# Patient Record
Sex: FEMALE | Race: BLACK OR AFRICAN AMERICAN | URBAN - METROPOLITAN AREA
[De-identification: names, ages, dates, MRNs, and addresses within clinical notes are randomized per-mention and may not be internally consistent; named-entity substitution may affect disease eponyms.]

---

## 2023-10-05 ENCOUNTER — HOSPITAL ENCOUNTER (OUTPATIENT)
Dept: MAMMOGRAPHY | Age: 38
Discharge: HOME | End: 2023-10-10

## 2023-10-05 DIAGNOSIS — Z12.31 VISIT FOR SCREENING MAMMOGRAM: ICD-10-CM

## 2024-03-08 ENCOUNTER — HOSPITAL ENCOUNTER (EMERGENCY)
Age: 39
Discharge: HOME | End: 2024-03-08
Attending: EMERGENCY MEDICINE
Payer: COMMERCIAL

## 2024-03-08 ENCOUNTER — APPOINTMENT (OUTPATIENT)
Dept: GENERAL RADIOLOGY | Age: 39
End: 2024-03-08
Payer: COMMERCIAL

## 2024-03-08 VITALS
WEIGHT: 188.49 LBS | SYSTOLIC BLOOD PRESSURE: 96 MMHG | DIASTOLIC BLOOD PRESSURE: 63 MMHG | TEMPERATURE: 98.1 F | HEART RATE: 64 BPM | RESPIRATION RATE: 18 BRPM | OXYGEN SATURATION: 100 %

## 2024-03-08 DIAGNOSIS — S39.012A STRAIN OF LUMBAR REGION, INITIAL ENCOUNTER: ICD-10-CM
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
## 2024-03-08 DIAGNOSIS — T07.XXXA MULTIPLE CONTUSIONS: ICD-10-CM

## 2024-03-08 PROCEDURE — 73522 X-RAY EXAM HIPS BI 3-4 VIEWS: CPT

## 2024-03-08 PROCEDURE — 73562 X-RAY EXAM OF KNEE 3: CPT

## 2024-03-08 PROCEDURE — 73630 X-RAY EXAM OF FOOT: CPT

## 2024-03-08 PROCEDURE — 99283 EMERGENCY DEPT VISIT LOW MDM: CPT | Performed by: EMERGENCY MEDICINE

## 2024-03-08 PROCEDURE — 72100 X-RAY EXAM L-S SPINE 2/3 VWS: CPT

## 2024-03-08 RX ORDER — IBUPROFEN 800 MG/1
800 TABLET ORAL EVERY 8 HOURS PRN
Qty: 30 TABLET | Refills: 0 | Status: SHIPPED | OUTPATIENT
Start: 2024-03-08

## 2024-03-08 RX ORDER — CYCLOBENZAPRINE HCL 10 MG
10 TABLET ORAL 3 TIMES DAILY PRN
Qty: 30 TABLET | Refills: 0 | Status: SHIPPED | OUTPATIENT
Start: 2024-03-08 | End: 2024-03-18
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
## 2024-05-07 ENCOUNTER — HOSPITAL ENCOUNTER (EMERGENCY)
Age: 39
Discharge: HOME | End: 2024-05-07
Payer: COMMERCIAL

## 2024-05-07 ENCOUNTER — APPOINTMENT (OUTPATIENT)
Dept: CT IMAGING | Age: 39
End: 2024-05-07
Payer: COMMERCIAL

## 2024-05-07 VITALS
DIASTOLIC BLOOD PRESSURE: 60 MMHG | HEART RATE: 55 BPM | RESPIRATION RATE: 16 BRPM | HEIGHT: 67 IN | OXYGEN SATURATION: 95 % | WEIGHT: 189.6 LBS | SYSTOLIC BLOOD PRESSURE: 109 MMHG | BODY MASS INDEX: 29.76 KG/M2 | TEMPERATURE: 98 F

## 2024-05-07 DIAGNOSIS — R51.9 RIGHT SIDED FACIAL PAIN: ICD-10-CM

## 2024-05-07 DIAGNOSIS — K11.8 PAROTID MASS: Primary | ICD-10-CM

## 2024-05-07 LAB
ANION GAP SERPL CALCULATED.3IONS-SCNC: 12 MMOL/L (ref 3–16)
BASOPHILS # BLD: 0.1 K/UL (ref 0–0.2)
BASOPHILS NFR BLD: 1.1 %
BUN SERPL-MCNC: 10 MG/DL (ref 7–20)
CALCIUM SERPL-MCNC: 8.9 MG/DL (ref 8.3–10.6)
CHLORIDE SERPL-SCNC: 103 MMOL/L (ref 99–110)
CO2 SERPL-SCNC: 22 MMOL/L (ref 21–32)
CREAT SERPL-MCNC: 0.6 MG/DL (ref 0.6–1.1)
DEPRECATED RDW RBC AUTO: 14.7 % (ref 12.4–15.4)
EOSINOPHIL # BLD: 0.2 K/UL (ref 0–0.6)
EOSINOPHIL NFR BLD: 3 %
GFR SERPLBLD CREATININE-BSD FMLA CKD-EPI: >90 ML/MIN/{1.73_M2}
GLUCOSE SERPL-MCNC: 113 MG/DL (ref 70–99)
HCT VFR BLD AUTO: 37 % (ref 36–48)
HGB BLD-MCNC: 12.6 G/DL (ref 12–16)
LYMPHOCYTES # BLD: 4 K/UL (ref 1–5.1)
LYMPHOCYTES NFR BLD: 48.1 %
MCH RBC QN AUTO: 28.8 PG (ref 26–34)
MCHC RBC AUTO-ENTMCNC: 34 G/DL (ref 31–36)
MCV RBC AUTO: 84.6 FL (ref 80–100)
MONOCYTES # BLD: 0.4 K/UL (ref 0–1.3)
MONOCYTES NFR BLD: 4.9 %
NEUTROPHILS # BLD: 3.6 K/UL (ref 1.7–7.7)
NEUTROPHILS NFR BLD: 42.9 %
PLATELET # BLD AUTO: 263 K/UL (ref 135–450)
PLATELET BLD QL SMEAR: ADEQUATE
PMV BLD AUTO: 9.4 FL (ref 5–10.5)
POTASSIUM SERPL-SCNC: 3.9 MMOL/L (ref 3.5–5.1)
RBC # BLD AUTO: 4.37 M/UL (ref 4–5.2)
REASON FOR REJECTION: NORMAL
REJECTED TEST: NORMAL
SLIDE REVIEW: NORMAL
SODIUM SERPL-SCNC: 137 MMOL/L (ref 136–145)
TSH SERPL DL<=0.005 MIU/L-ACNC: 0.99 UIU/ML (ref 0.27–4.2)
WBC # BLD AUTO: 8.3 K/UL (ref 4–11)

## 2024-05-07 PROCEDURE — 80048 BASIC METABOLIC PNL TOTAL CA: CPT

## 2024-05-07 PROCEDURE — 85025 COMPLETE CBC W/AUTO DIFF WBC: CPT

## 2024-05-07 PROCEDURE — 84443 ASSAY THYROID STIM HORMONE: CPT

## 2024-05-07 PROCEDURE — 99285 EMERGENCY DEPT VISIT HI MDM: CPT

## 2024-05-07 PROCEDURE — 6360000004 HC RX CONTRAST MEDICATION: Performed by: PHYSICIAN ASSISTANT

## 2024-05-07 PROCEDURE — 70491 CT SOFT TISSUE NECK W/DYE: CPT

## 2024-05-07 RX ADMIN — IOPAMIDOL 75 ML: 755 INJECTION, SOLUTION INTRAVENOUS at 21:30

## 2024-05-07 ASSESSMENT — PAIN DESCRIPTION - ORIENTATION: ORIENTATION: RIGHT

## 2024-05-07 ASSESSMENT — PAIN DESCRIPTION - DESCRIPTORS: DESCRIPTORS: DISCOMFORT

## 2024-05-07 ASSESSMENT — PAIN DESCRIPTION - LOCATION: LOCATION: FACE

## 2024-05-07 ASSESSMENT — PAIN DESCRIPTION - ONSET: ONSET: PROGRESSIVE

## 2024-05-07 ASSESSMENT — PAIN DESCRIPTION - FREQUENCY: FREQUENCY: CONTINUOUS

## 2024-05-07 ASSESSMENT — PAIN SCALES - GENERAL: PAINLEVEL_OUTOF10: 6

## 2024-05-07 ASSESSMENT — PAIN DESCRIPTION - PAIN TYPE: TYPE: ACUTE PAIN

## 2024-05-07 ASSESSMENT — PAIN - FUNCTIONAL ASSESSMENT: PAIN_FUNCTIONAL_ASSESSMENT: ACTIVITIES ARE NOT PREVENTED

## 2024-05-07 ASSESSMENT — PAIN DESCRIPTION - DIRECTION: RADIATING_TOWARDS: THROAT

## 2024-05-07 NOTE — ED TRIAGE NOTES
Patient to the ER with complaints of swelling to the right side of her jaw that is causing pain radiating into her throat.   Denies any injury to the area.  Denies any concern for dental problems.   Symptoms started today.     Patient is requesting to have her thyroid checked.  She reports fatigue for several months and says thyroid problems run in her family.     Alert and oriented x4 and ambulatory at time of triage.

## 2024-05-08 NOTE — ED PROVIDER NOTES
worsen.        I am the Primary Clinician of Record.    FINAL IMPRESSION      1. Parotid mass    2. Right sided facial pain          DISPOSITION/PLAN     DISPOSITION Decision To Discharge 05/07/2024 10:36:08 PM      PATIENT REFERRED TO:  [facility] Emergency Department  [address] [facility] [address]  [phone]  Schedule an appointment as soon as possible for a visit in 1 day  for reevaluation    Benjamin Garcia MD  [address] [place] [address] [address]  [place] [address]  [phone]          Benjamin Garcia MD  [address] [place] [address] [address]  [place] [address]  [phone]            DISCHARGE MEDICATIONS:  New Prescriptions    No medications on file       DISCONTINUED MEDICATIONS:  Discontinued Medications    CHOLECALCIFEROL (MAXIMUM D3) 93697 UNITS CAPS    Take 1 capsule by mouth every 7 days.    DEXTROAMPHETAMINE (DEXTROSTAT) 5 MG TABLET    Take 2 tabs by mouth in AM and one in PM.    ESTROGENS, CONJUGATED, (PREMARIN) 1.25 MG TABLET    Take 2 tabs po q 6 hrs today    IBUPROFEN (ADVIL;MOTRIN) 800 MG TABLET    Take 1 tablet by mouth every 8 hours as needed for Pain    MONTELUKAST (SINGULAIR) 10 MG TABLET    Take 1 tablet by mouth nightly.    SERTRALINE (ZOLOFT) 50 MG TABLET    Take 1 tablet by mouth daily.    ZOLPIDEM (AMBIEN) 10 MG TABLET    Take 1 tablet by mouth nightly as needed.              (Please note that portions of this note were completed with a voice recognition program.  Efforts were made to edit the dictations but occasionally words are mis-transcribed.)    Luiza Ortega PA-C (electronically signed)            Luiza Ortega PA-C  05/07/24 [id number]

## 2024-05-08 NOTE — DISCHARGE INSTRUCTIONS
It is important to follow-up with ENT for further evaluation.  If you are unable to get in with them in a timely manner, just reach out to your primary care physician as they can see you as well.  If your symptoms worsen or new concerning symptoms present, return to the emergency department